# Patient Record
Sex: MALE | Race: WHITE | Employment: UNEMPLOYED | URBAN - METROPOLITAN AREA
[De-identification: names, ages, dates, MRNs, and addresses within clinical notes are randomized per-mention and may not be internally consistent; named-entity substitution may affect disease eponyms.]

---

## 2023-03-10 ENCOUNTER — HOSPITAL ENCOUNTER (EMERGENCY)
Age: 23
Discharge: HOME | End: 2023-03-10
Attending: STUDENT IN AN ORGANIZED HEALTH CARE EDUCATION/TRAINING PROGRAM
Payer: COMMERCIAL

## 2023-03-10 VITALS
TEMPERATURE: 97.6 F | RESPIRATION RATE: 16 BRPM | DIASTOLIC BLOOD PRESSURE: 85 MMHG | HEART RATE: 100 BPM | SYSTOLIC BLOOD PRESSURE: 140 MMHG | OXYGEN SATURATION: 98 % | WEIGHT: 180 LBS

## 2023-03-10 DIAGNOSIS — S61.211A LACERATION OF LEFT INDEX FINGER WITHOUT FOREIGN BODY WITHOUT DAMAGE TO NAIL, INITIAL ENCOUNTER: Primary | ICD-10-CM

## 2023-03-10 DIAGNOSIS — S61.012A LACERATION OF LEFT THUMB WITHOUT FOREIGN BODY WITHOUT DAMAGE TO NAIL, INITIAL ENCOUNTER: ICD-10-CM

## 2023-03-10 PROCEDURE — 99282 EMERGENCY DEPT VISIT SF MDM: CPT

## 2023-03-10 PROCEDURE — 75810000293 HC SIMP/SUPERF WND  RPR

## 2023-03-10 NOTE — Clinical Note
[address]  [facility] EMERGENCY DEPT  [address] [place] [id number] [phone]    Work/School Note    Date: 3/10/2023    To Whom It May concern:      Lex Sy was seen and treated today in the emergency room by the following provider(s):  Attending Provider: Abby Cerda DO  Physician Assistant: FELIPE Cullen. Lex Sy is excused from work/school on 03/10/23. He is clear to return to work/school on 03/11/23.         Sincerely,          FELIPE Jiménez

## 2023-03-11 NOTE — ED PROVIDER NOTES
[facility] EMERGENCY DEPT  EMERGENCY DEPARTMENT ENCOUNTER       Pt Name: Florin Grady  MRN: [id number]  [place] 2000  Date of evaluation: 3/10/2023  Provider: FELIPE Xiao   PCP: Denisse Barraza MD  Note Started: 1:03 AM 3/11/23     ED attending involment: I have seen and evaluated the patient. My supervision physician was available for consultation. CHIEF COMPLAINT       Chief Complaint   Patient presents with    Laceration     Laceration to left thumb and index finger after opening pineapple juice can at work tonight. HISTORY OF PRESENT ILLNESS: 1 or more elements      History From: Patient  HPI Limitations : None     Florin Grady is a 20 y.o. male who presents to ED with 2 lacerations. He was using a knife to open a can in the kitchen earlier today when he actually cut his left hand resulting in small lacerations to his left index finger and thumb. He denies any loss of range of motion, numbness or tingling. He has had a tetanus vaccination in the last 5 years. Nursing Notes were all reviewed and agreed with or any disagreements were addressed in the HPI. REVIEW OF SYSTEMS      Review of Systems     Positives and Pertinent negatives as per HPI. PAST HISTORY     Past Medical History:  Past Medical History:   Diagnosis Date    VSD (ventricular septal defect)     closed       Past Surgical History:  Past Surgical History:   Procedure Laterality Date    HX HEENT      adenoids, cautery of some nasal passages       Family History:  No family history on file. Social History: Allergies:  No Known Allergies    CURRENT MEDICATIONS      Discharge Medication List as of 3/10/2023 10:38 PM        CONTINUE these medications which have NOT CHANGED    Details   multivitamin (ONE A DAY) tablet Take 1 Tab by mouth daily. Historical Med, 1 Tab             PHYSICAL EXAM      ED Triage Vitals   ED Encounter Vitals Group      BP 03/10/23 2038 (!) 140/85      Pulse (Heart Rate) 03/10/23 2038 100 Resp Rate 03/10/23 2038 16      Temp 03/10/23 2042 97.6 °F (36.4 °C)      Temp src --       O2 Sat (%) 03/10/23 2038 98 %      Weight 03/10/23 2038 180 lb      Height --         Physical Exam  Vitals and nursing note reviewed. Exam conducted with a chaperone present. Constitutional:       Appearance: Normal appearance. HENT:      Head: Atraumatic. Right Ear: External ear normal.      Left Ear: External ear normal.      Nose: Nose normal.      Mouth/Throat:      Pharynx: Oropharynx is clear. Eyes:      Extraocular Movements: Extraocular movements intact. Conjunctiva/sclera: Conjunctivae normal.   Cardiovascular:      Rate and Rhythm: Normal rate and regular rhythm. Pulmonary:      Effort: Pulmonary effort is normal.      Breath sounds: Normal breath sounds. Abdominal:      General: Abdomen is flat. Palpations: Abdomen is soft. Musculoskeletal:         General: Signs of injury present. Normal range of motion. Cervical back: Normal range of motion and neck supple. Comments: There is an approximate 2 cm very superficial laceration overlying the second digit middle phalanx. There is a another secondary small laceration approximately 1 cm in size over the first digit proximal phalanx. wounds were irrigated and explored with no evidence of tendon damage. Full range of motion at the interphalangeal joints with flexion and extension. 2+ radial pulses   Skin:     General: Skin is warm. Capillary Refill: Capillary refill takes less than 2 seconds. Findings: No erythema or rash. Neurological:      General: No focal deficit present. Mental Status: He is alert. Psychiatric:         Mood and Affect: Mood normal.         Behavior: Behavior normal.         Thought Content: Thought content normal.         Judgment: Judgment normal.        DIAGNOSTIC RESULTS   LABS:     No results found for this or any previous visit (from the past 12 hour(s)). RADIOLOGY:  Non-plain film images such as CT, Ultrasound and MRI are read by the radiologist. Plain radiographic images are visualized and preliminarily interpreted by the ED Provider with the below findings:          Interpretation per the Radiologist below, if available at the time of this note:     No results found. PROCEDURES   Unless otherwise noted below, none  Wound Repair    Date/Time: 3/11/2023 1:05 AM  Performed by: [id number] provider: Dr. Tanya Wei  Preparation: skin prepped with Shur-Clens  Pre-procedure re-eval: Immediately prior to the procedure, the patient was reevaluated and found suitable for the planned procedure and any planned medications. Location details: left index finger and left thumb  Wound length:2.5 cm or less  Foreign bodies: no foreign bodies  Irrigation solution: tap water  Irrigation method: tap  Skin closure: glue  Approximation: close  Patient tolerance: patient tolerated the procedure well with no immediate complications  My total time at bedside, performing this procedure was 1-15 minutes. EMERGENCY DEPARTMENT COURSE and DIFFERENTIAL DIAGNOSIS/MDM   Vitals:    Vitals:    03/10/23 2038 03/10/23 2042   BP: (!) 140/85    Pulse: 100    Resp: 16    Temp:  97.6 °F (36.4 °C)   SpO2: 98%    Weight: 81.6 kg (180 lb)         Patient was given the following medications:  Medications - No data to display    CONSULTS: (Who and What was discussed)  None    Chronic Conditions: none    Social Determinants affecting Dx or Tx: None    Records Reviewed (source and summary): Nursing notes    MDM (CC/HPI Summary, DDx, ED Course, Reassessment, Disposition Considerations -Tests not done, Shared Decision Making, Pt Expectation of Test or Tx.): Patient was evaluated for a laceration to his left second and first finger. Wounds were very small and after irrigation showed no signs of complication to include foreign body or tendon damage. These were closed with Dermabond.   He is up-to-date on his tetanus. Patient declined antibiotic prescription. He was advised on symptomatic care and will return to the ED for signs of infection. Patient expressed understanding given to the discharge instructions and treatment plan             FINAL IMPRESSION     1. Laceration of left index finger without foreign body without damage to nail, initial encounter    2. Laceration of left thumb without foreign body without damage to nail, initial encounter          DISPOSITION/PLAN   Discharged        Care plan outlined and precautions discussed. Patient has no new complaints, changes, or physical findings. Results of evaluation were reviewed with the patient. All medications were reviewed with the patient. All of pt's questions and concerns were addressed. Patient was instructed and agrees to follow up with PCP as needed, as well as to return to the ED upon further deterioration. Patient is ready to go home. PATIENT REFERRED TO:  Follow-up Information       Follow up With Specialties Details Why Contact Info    [facility] EMERGENCY DEPT Emergency Medicine Go to  If symptoms worsen [address] [place] [address]  [phone]              DISCHARGE MEDICATIONS:  Discharge Medication List as of 3/10/2023 10:38 PM            DISCONTINUED MEDICATIONS:  Discharge Medication List as of 3/10/2023 10:38 PM          I am the Primary Clinician of Record. FELIPE Yip (electronically signed)    (Please note that parts of this dictation were completed with voice recognition software. Quite often unanticipated grammatical, syntax, homophones, and other interpretive errors are inadvertently transcribed by the computer software. Please disregards these errors.  Please excuse any errors that have escaped final proofreading.)